# Patient Record
Sex: MALE | Race: WHITE | NOT HISPANIC OR LATINO | Employment: OTHER | URBAN - METROPOLITAN AREA
[De-identification: names, ages, dates, MRNs, and addresses within clinical notes are randomized per-mention and may not be internally consistent; named-entity substitution may affect disease eponyms.]

---

## 2017-01-04 ENCOUNTER — TREATMENT (OUTPATIENT)
Dept: PHYSICAL THERAPY | Facility: CLINIC | Age: 82
End: 2017-01-04

## 2017-01-04 DIAGNOSIS — M54.50 ACUTE RIGHT-SIDED LOW BACK PAIN WITHOUT SCIATICA: Primary | ICD-10-CM

## 2017-01-04 DIAGNOSIS — R29.3 ABNORMAL POSTURE: ICD-10-CM

## 2017-01-04 PROCEDURE — 97140 MANUAL THERAPY 1/> REGIONS: CPT | Performed by: PHYSICAL THERAPIST

## 2017-01-04 PROCEDURE — 97110 THERAPEUTIC EXERCISES: CPT | Performed by: PHYSICAL THERAPIST

## 2017-01-04 PROCEDURE — 97112 NEUROMUSCULAR REEDUCATION: CPT | Performed by: PHYSICAL THERAPIST

## 2017-01-05 NOTE — PROGRESS NOTES
Daily Progress Note  Visits: 6      Subjective  Doing okay.  I still just notice pain when I do too much standing.  The back and hip is doing better overall because I can do more with less rest or pain.   Pain level: (0-10):   Objective      See manual, procedures and exercise log  Assessment/Plan  Pt did well with PT intervention today.  We were able to progress to prone press ups and posture training.  He still requires consistent cues (verbal and tactile) but can assume optimal postures and hold momentarily.    Other: re-assess next visit       Manual Therapy:    12     mins  88087;  Therapeutic Exercise:    20     mins  44554;     Neuromuscular Adeline:    8    mins  27933;    Therapeutic Activity:     -     mins  40419;     Gait Training:      -     mins  63806;     Ultrasound:     -     mins  11910;    Electrical Stimulation:    -     mins  15515 ( );  Dry Needling     -     mins self-pay    Timed Code Treatment: 40 Minutes   Total Treatment Time: 65 Minutes      Mateusz Copeland PT  Physical Therapist, DPT  #[id number]

## 2017-01-06 ENCOUNTER — TREATMENT (OUTPATIENT)
Dept: PHYSICAL THERAPY | Facility: CLINIC | Age: 82
End: 2017-01-06

## 2017-01-06 DIAGNOSIS — M54.50 ACUTE RIGHT-SIDED LOW BACK PAIN WITHOUT SCIATICA: ICD-10-CM

## 2017-01-06 DIAGNOSIS — R29.3 ABNORMAL POSTURE: Primary | ICD-10-CM

## 2017-01-06 PROCEDURE — 97530 THERAPEUTIC ACTIVITIES: CPT | Performed by: PHYSICAL THERAPIST

## 2017-01-06 PROCEDURE — 97110 THERAPEUTIC EXERCISES: CPT | Performed by: PHYSICAL THERAPIST

## 2017-01-06 NOTE — PROGRESS NOTES
Progress Note      Patient: Donnell Nguyen   : 1935  Diagnosis/ICD-10 Code:  Abnormal posture [R29.3]  Referring practitioner: MARCELA Bowser  Date of Initial Visit: 16  Today's Date: 2017  Patient seen for 7 sessions    Subjective:   Donnell Nguyen reports: I have more days with less pain in the back.  I do still get tired and some pain in the back if I get too involved in what I'm doing at home and forget to rest or stretch.  When it doesn't hurt, it feels really good.  It will just hit me every now and then.    Subjective Questionnaire: Oswestry: 20% (40% at eval)    Clinical Progress: improved  Home Program Compliance: Yes, but could be better(self reported)   Treatment has included: therapeutic exercise, neuromuscular re-education, manual therapy, therapeutic activity and moist heat    Objective:    Posture: lessed flexed trunk in standing. Observed standing with < 10 degrees for trunk flexion.    AROM:      Lumbar Spine Range of Motion  Lumbar Spine Flexion: 65  Lumbar Spine Extension: 20  Lumbar Spine Sidebend Left: 26  Lumbar Spine Sidebend Right: 23  Lumbar Spine Rotation Left: 65  Lumbar Spine Rotation Right: 62  Strength:      Strength RLE  R Hip Flexion: 5/5  R Hip Extension: 4/5 ((B))  R Hip ABduction: 4+/5  R Hip ADduction: 4+/5  R Knee Flexion: 5/5  R Knee Extension: 5/5  R Ankle Dorsiflexion: 5-/5  R Ankle Plantar Flexion: 4+/5                Assessment:   Functional Limitations: Lifting, Walking, Complaints of Pain, Difficulty moving, Decreased ROM, Postural Dysfunction, Poor segmental mobility, Decreased ability to perform ADL's   Focus has only recently been able to shift toward postural re-education and endurance training.  He would benefit from a couple of weeks to help solidify postural awareness and progression of TE.      Plan:   PT Interventions: Therapeutic exercise - AROM, Therapeutic exercise - stretching, Therapeutic exercise - strengthening, Manual Therapy, Soft  Tissue mobilization, Posture training,  training, Balance Training, Home Exercise Program  Progress toward previous goals: Partially Met     SHORT TERM GOALS:   2 weeks      1. Patient to be compliant and progression of HEP   2. Pain level < 5/10 at worst with mentioned activities to improve function  3. Increased thoracic, lumbar and SIJ mobility to allow for increased lumbar AROM with less pain  4. Increased lumbar AROM to by 25% in all planes to allow for increased ease with sit-stand transfers and functional activities.     LONG TERM GOALS:  4 weeks   1. Pt. to score < 15 % on Back Index  2. Pain level < 2/10 with all listed activities to return to normal  3. Lumbar AROM to WFL to allow for return to household & recreational activities w/o increased symptoms  4. (B) LE and lower abdominal strength to 5/5 to allow for pushing, pulling and activities to occur without pain (driving, sitting, household)    Recommendations: Continue as planned  Timeframe: 2 weeks  Prognosis to achieve goals: good    Therapy Exercise 49688 30 minutes and Ther Act 28536 10 minutes (for tests and measurements)     Timed Code Treatment: 40   Minutes     Total Treatment Time: 55      Minutes      PT Signature: Mateusz Copeland PT  Lic # [id number]      Based upon review of the patient's progress and continued therapy plan, it is my medical opinion that Donnell Nguyen should continue physical therapy treatment at [facility]  [address] [facility], [address] [address].    Signature:  MARCELA Bowser

## 2017-01-06 NOTE — LETTER
Progress Note      Patient: Donnell Nguyen   : 1935  Diagnosis/ICD-10 Code:  Abnormal posture [R29.3]  Referring practitioner: MARCELA Bowser  Date of Initial Visit: 16  Today's Date: 2017  Patient seen for 7 sessions    Subjective:   Donnell Nguyen reports: I have more days with less pain in the back.  I do still get tired and some pain in the back if I get too involved in what I'm doing at home and forget to rest or stretch.  When it doesn't hurt, it feels really good.  It will just hit me every now and then.    Subjective Questionnaire: Oswestry: 20% (40% at eval)    Clinical Progress: improved  Home Program Compliance: Yes, but could be better(self reported)   Treatment has included: therapeutic exercise, neuromuscular re-education, manual therapy, therapeutic activity and moist heat    Objective:    Posture: lessed flexed trunk in standing. Observed standing with < 10 degrees for trunk flexion.    AROM:      Lumbar Spine Range of Motion  Lumbar Spine Flexion: 65  Lumbar Spine Extension: 20  Lumbar Spine Sidebend Left: 26  Lumbar Spine Sidebend Right: 23  Lumbar Spine Rotation Left: 65  Lumbar Spine Rotation Right: 62  Strength:      Strength RLE  R Hip Flexion: 5/5  R Hip Extension: 4/5 ((B))  R Hip ABduction: 4+/5  R Hip ADduction: 4+/5  R Knee Flexion: 5/5  R Knee Extension: 5/5  R Ankle Dorsiflexion: 5-/5  R Ankle Plantar Flexion: 4+/5                Assessment:   Functional Limitations: Lifting, Walking, Complaints of Pain, Difficulty moving, Decreased ROM, Postural Dysfunction, Poor segmental mobility, Decreased ability to perform ADL's   Focus has only recently been able to shift toward postural re-education and endurance training.  He would benefit from a couple of weeks to help solidify postural awareness and progression of TE.      Plan:   PT Interventions: Therapeutic exercise - AROM, Therapeutic exercise - stretching, Therapeutic exercise - strengthening, Manual Therapy, Soft  Tissue mobilization, Posture training,  training, Balance Training, Home Exercise Program  Progress toward previous goals: Partially Met     SHORT TERM GOALS:   2 weeks      1. Patient to be compliant and progression of HEP   2. Pain level < 5/10 at worst with mentioned activities to improve function  3. Increased thoracic, lumbar and SIJ mobility to allow for increased lumbar AROM with less pain  4. Increased lumbar AROM to by 25% in all planes to allow for increased ease with sit-stand transfers and functional activities.     LONG TERM GOALS:  4 weeks   1. Pt. to score < 15 % on Back Index  2. Pain level < 2/10 with all listed activities to return to normal  3. Lumbar AROM to WFL to allow for return to household & recreational activities w/o increased symptoms  4. (B) LE and lower abdominal strength to 5/5 to allow for pushing, pulling and activities to occur without pain (driving, sitting, household)    Recommendations: Continue as planned  Timeframe: 2 weeks  Prognosis to achieve goals: good    Therapy Exercise 70998 30 minutes    Timed Code Treatment: 30   Minutes     Total Treatment Time: 55      Minutes      PT Signature: Mateusz Copeland PT  Lic # [id number]      Based upon review of the patient's progress and continued therapy plan, it is my medical opinion that Donnell Nguyen should continue physical therapy treatment at [facility]  [address] [facility], [address] [address].    Signature:  MARCELA Bowser

## 2017-01-09 ENCOUNTER — TREATMENT (OUTPATIENT)
Dept: PHYSICAL THERAPY | Facility: CLINIC | Age: 82
End: 2017-01-09

## 2017-01-09 DIAGNOSIS — R29.3 ABNORMAL POSTURE: Primary | ICD-10-CM

## 2017-01-09 DIAGNOSIS — M54.50 ACUTE RIGHT-SIDED LOW BACK PAIN WITHOUT SCIATICA: ICD-10-CM

## 2017-01-09 PROCEDURE — 97110 THERAPEUTIC EXERCISES: CPT | Performed by: PHYSICAL THERAPIST

## 2017-01-11 NOTE — PROGRESS NOTES
Daily Progress Note  Visits: 8      Subjective  I'm moving slowly today.  I have had a little hip discomfort today and I'm a little tired.    Pain level: (0-10):   Objective : Pt. Demonstrating less foot clearance on left and shuffled gait today.      See manual, procedures and exercise log  Assessment/Plan  Pt was able to work through all TE well today.  We continue to work on optimizing his posture.  He concluded treatment with moist heat to lumbar spine and reported feeling better.      Progress per Plan of Care       Manual Therapy:    -     mins  13514;  Therapeutic Exercise:    35     mins  90192;     Neuromuscular Adeline:    5    mins  71130;    Therapeutic Activity:     -     mins  12457;     Gait Training:      -     mins  82441;     Ultrasound:     -     mins  47894;    Electrical Stimulation:    -     mins  56821 ( );  Dry Needling     -     mins self-pay    Timed Code Treatment: 40 Minutes   Total Treatment Time: 65 Minutes      Mateusz Copeland PT  Physical Therapist, DPT  #[id number]

## 2017-01-12 ENCOUNTER — TREATMENT (OUTPATIENT)
Dept: PHYSICAL THERAPY | Facility: CLINIC | Age: 82
End: 2017-01-12

## 2017-01-12 DIAGNOSIS — R29.3 ABNORMAL POSTURE: Primary | ICD-10-CM

## 2017-01-12 DIAGNOSIS — M54.50 ACUTE RIGHT-SIDED LOW BACK PAIN WITHOUT SCIATICA: ICD-10-CM

## 2017-01-12 PROCEDURE — 97140 MANUAL THERAPY 1/> REGIONS: CPT | Performed by: PHYSICAL THERAPIST

## 2017-01-12 PROCEDURE — 97110 THERAPEUTIC EXERCISES: CPT | Performed by: PHYSICAL THERAPIST

## 2017-01-13 NOTE — PROGRESS NOTES
Daily Progress Note  Visits: 9      Subjective  Still feeling a little tired/slow today, but a little better than last visit.    Pain level: (0-10):   Objective : Posture: continues to be challenge to maintain neutral spine.      See manual, procedures and exercise log  Assessment/Plan  Pt was able to work through complete program and does have increased capacity of optimal posture but has difficulty maintaining.  Manual intervention provided today to enhance lower thoracic and lumbar IV mobility.     Progress per Plan of Care       Manual Therapy:    8     mins  75362;  Therapeutic Exercise:    30     mins  00246;     Neuromuscular Adeline:    5    mins  83514;    Therapeutic Activity:     -     mins  27084;     Gait Training:      -     mins  78833;     Ultrasound:     -     mins  10567;    Electrical Stimulation:    -     mins  56612 ( );  Dry Needling     -     mins self-pay    Timed Code Treatment: 43 Minutes   Total Treatment Time: 65 Minutes      Mateusz Copeland PT  Physical Therapist, DPT  #[id number]

## 2017-01-16 ENCOUNTER — TREATMENT (OUTPATIENT)
Dept: PHYSICAL THERAPY | Facility: CLINIC | Age: 82
End: 2017-01-16

## 2017-01-16 DIAGNOSIS — R29.3 ABNORMAL POSTURE: Primary | ICD-10-CM

## 2017-01-16 DIAGNOSIS — M54.50 ACUTE RIGHT-SIDED LOW BACK PAIN WITHOUT SCIATICA: ICD-10-CM

## 2017-01-16 PROCEDURE — 97140 MANUAL THERAPY 1/> REGIONS: CPT | Performed by: PHYSICAL THERAPIST

## 2017-01-16 PROCEDURE — 97110 THERAPEUTIC EXERCISES: CPT | Performed by: PHYSICAL THERAPIST

## 2017-01-16 PROCEDURE — 97112 NEUROMUSCULAR REEDUCATION: CPT | Performed by: PHYSICAL THERAPIST

## 2017-01-16 NOTE — PROGRESS NOTES
Daily Progress Note  Visits: 10      Subjective  Pt reports feeling pretty good today. He has started on parkinson's dance classes and doing well.  It helps to loosen up the hips.    Pain level: (0-10):   Objective      See manual, procedures and exercise log  Assessment/Plan  Pt tolerated treatment progression well.  We focused on endurance and fatigue was noted.  Breaks provided frequently.      Progress per Plan of Care       Manual Therapy:    15     mins  37467;  Therapeutic Exercise:    20     mins  03482;     Neuromuscular Adeline:    8    mins  93171;    Therapeutic Activity:     -     mins  71627;     Gait Training:      -     mins  78377;     Ultrasound:     -     mins  26388;    Electrical Stimulation:    -     mins  96856 ( );  Dry Needling     -     mins self-pay    Timed Code Treatment: 43 Minutes   Total Treatment Time: 60 Minutes      Mateusz Copeland PT  Physical Therapist, DPT  #[id number]

## 2017-01-19 ENCOUNTER — TREATMENT (OUTPATIENT)
Dept: PHYSICAL THERAPY | Facility: CLINIC | Age: 82
End: 2017-01-19

## 2017-01-19 DIAGNOSIS — M54.50 ACUTE RIGHT-SIDED LOW BACK PAIN WITHOUT SCIATICA: ICD-10-CM

## 2017-01-19 DIAGNOSIS — R29.3 ABNORMAL POSTURE: Primary | ICD-10-CM

## 2017-01-19 PROCEDURE — 97110 THERAPEUTIC EXERCISES: CPT | Performed by: PHYSICAL THERAPIST

## 2017-01-19 PROCEDURE — 97140 MANUAL THERAPY 1/> REGIONS: CPT | Performed by: PHYSICAL THERAPIST

## 2017-01-22 NOTE — PROGRESS NOTES
Physical Therapy Daily Progress Note  Visits: 11     Subjective Ampvignesho Isetti reports:  I continue to have pain into the leg on the L side but only when I stand/walk.  Sometimes I don't have any pain at all.  I still have a tought time standing upright.  It feels different.    Pain level: (0-10): 0/10 at rest.  3/10 with walking today.      Objective   See Exercise, Manual, and Modality Logs for complete treatment.       Assessment/Plan: Pt was able to tolerate treatment well but we were unable to reproduce symptoms of leg pain beyond standing and walking.      Progress per Plan of Care     Manual Therapy:    12     mins  70287;  Therapeutic Exercise:    25     mins  56144;     Neuromuscular Adeline:        mins  39536;    Therapeutic Activity:          mins  27652;     Gait Training:           mins  41699;     Ultrasound:          mins  33834;    Electrical Stimulation:         mins  50678 ( );  Dry Needling          mins self-pay    Timed Treatment:   37   mins   Total Treatment:     65   mins    LIONEL Edward License #[id number]    Physical Therapist

## 2017-02-22 ENCOUNTER — DOCUMENTATION (OUTPATIENT)
Dept: PHYSICAL THERAPY | Facility: CLINIC | Age: 82
End: 2017-02-22

## 2017-02-22 NOTE — PROGRESS NOTES
NAME: Donnell Nguyen     PATIENT MRN: [id number]  DATE: 2/22/2017    Discharge Note   Visits: 11, Treatment dates 12-13-17 to 1-19-17    Subjective     N/A     Objective     Unable to formally assess objective measures or outcome measure due to unscheduled discharge. Please see last visit or progress note on 1-19-17 to see function at last visit.    Goals: Partially Met    Assessment/Plan /Discharge status.    Pt will be discharged per patient request.  Upon completion of last visit, he did call to report that he would like to try to maintain things on his own and that he would call if more treatment was needed.      Plan Additional:  Discharge to TITO Copeland PT  Physical Therapist  [place] #[id number]

## 2017-03-28 DIAGNOSIS — M54.50 CHRONIC BILATERAL LOW BACK PAIN WITHOUT SCIATICA: Primary | ICD-10-CM

## 2017-03-28 DIAGNOSIS — G89.29 CHRONIC BILATERAL LOW BACK PAIN WITHOUT SCIATICA: Primary | ICD-10-CM

## 2017-09-20 ENCOUNTER — CLINICAL SUPPORT (OUTPATIENT)
Dept: FAMILY MEDICINE CLINIC | Facility: CLINIC | Age: 82
End: 2017-09-20

## 2017-09-20 DIAGNOSIS — Z23 NEED FOR VACCINATION: Primary | ICD-10-CM

## 2017-09-20 PROCEDURE — G0008 ADMIN INFLUENZA VIRUS VAC: HCPCS | Performed by: FAMILY MEDICINE

## 2018-06-15 ENCOUNTER — TELEPHONE (OUTPATIENT)
Dept: FAMILY MEDICINE CLINIC | Facility: CLINIC | Age: 83
End: 2018-06-15

## 2018-06-15 RX ORDER — DESONIDE 0.5 MG/G
CREAM TOPICAL 3 TIMES DAILY
Qty: 15 G | Refills: 2 | Status: SHIPPED | OUTPATIENT
Start: 2018-06-15 | End: 2018-11-24 | Stop reason: SDUPTHER

## 2018-06-20 ENCOUNTER — TELEPHONE (OUTPATIENT)
Dept: FAMILY MEDICINE CLINIC | Facility: CLINIC | Age: 83
End: 2018-06-20

## 2018-06-20 NOTE — TELEPHONE ENCOUNTER
Left message asking for order for a back brace to help him stand straight.    Also asking for a referral to see a podiatrist due to being flat footed. Says it is causing him to wear wrong shoes which is causing him to strain his back as well.    Please advise & I'll call him back,thanks!    [phone]

## 2018-06-22 DIAGNOSIS — M54.40 LOW BACK PAIN WITH SCIATICA, SCIATICA LATERALITY UNSPECIFIED, UNSPECIFIED BACK PAIN LATERALITY, UNSPECIFIED CHRONICITY: Primary | ICD-10-CM

## 2018-07-05 ENCOUNTER — OFFICE VISIT (OUTPATIENT)
Dept: FAMILY MEDICINE CLINIC | Facility: CLINIC | Age: 83
End: 2018-07-05

## 2018-07-05 VITALS
SYSTOLIC BLOOD PRESSURE: 100 MMHG | DIASTOLIC BLOOD PRESSURE: 64 MMHG | WEIGHT: 172 LBS | HEIGHT: 62 IN | HEART RATE: 64 BPM | OXYGEN SATURATION: 97 % | RESPIRATION RATE: 18 BRPM | BODY MASS INDEX: 31.65 KG/M2

## 2018-07-05 DIAGNOSIS — M48.061 SPINAL STENOSIS OF LUMBAR REGION WITHOUT NEUROGENIC CLAUDICATION: Primary | ICD-10-CM

## 2018-07-05 DIAGNOSIS — M54.42 CHRONIC MIDLINE LOW BACK PAIN WITH BILATERAL SCIATICA: ICD-10-CM

## 2018-07-05 DIAGNOSIS — G20 PARKINSON DISEASE (HCC): ICD-10-CM

## 2018-07-05 DIAGNOSIS — M54.41 CHRONIC MIDLINE LOW BACK PAIN WITH BILATERAL SCIATICA: ICD-10-CM

## 2018-07-05 DIAGNOSIS — G89.29 CHRONIC MIDLINE LOW BACK PAIN WITH BILATERAL SCIATICA: ICD-10-CM

## 2018-07-05 PROBLEM — G20.A1 PARKINSON DISEASE: Status: ACTIVE | Noted: 2018-07-05

## 2018-07-05 PROCEDURE — 99214 OFFICE O/P EST MOD 30 MIN: CPT | Performed by: FAMILY MEDICINE

## 2018-07-05 RX ORDER — AMANTADINE HYDROCHLORIDE 100 MG/1
100 TABLET ORAL EVERY 12 HOURS SCHEDULED
COMMUNITY

## 2018-07-05 RX ORDER — METOPROLOL SUCCINATE 50 MG/1
50 TABLET, EXTENDED RELEASE ORAL DAILY
COMMUNITY
End: 2021-01-11 | Stop reason: SDUPTHER

## 2018-07-05 RX ORDER — CARBIDOPA AND LEVODOPA 25; 100 MG/1; MG/1
2 TABLET, EXTENDED RELEASE ORAL 4 TIMES DAILY
COMMUNITY
End: 2020-06-10 | Stop reason: DRUGHIGH

## 2018-07-05 RX ORDER — NAPROXEN SODIUM 220 MG
220 TABLET ORAL 2 TIMES DAILY PRN
COMMUNITY
End: 2018-07-05

## 2018-07-05 RX ORDER — TAMSULOSIN HYDROCHLORIDE 0.4 MG/1
CAPSULE ORAL
COMMUNITY
Start: 2018-04-25

## 2018-07-05 NOTE — PROGRESS NOTES
Subjective   Donnell Nguyen is a 82 y.o. male.     History of Present Illness   Mr Nguyen is here w/ c/o back pain.  He is very hunched over and states it is difficult for him to straighten himself.  Mr Nguyen states standing and walking are quite difficult for him, standing to cook meals and walking around the house to perform ADL's can be quite taxing.  He is currently in Physical therapy and believes this is helping some.  He has a hx of Parkinsons and is taking a PT program that involves dance that helps with mobility. He has known lumbar stenosis and chronic lumabr pain with sciatica.  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     The following portions of the patient's history were reviewed and updated as appropriate: allergies, current medications, past family history, past medical history, past social history, past surgical history and problem list.    Review of Systems   Musculoskeletal: Positive for back pain and myalgias.   All other systems reviewed and are negative.      Objective   Physical Exam   Constitutional: He is oriented to person, place, and time. He appears well-developed and well-nourished.   HENT:   Head: Normocephalic and atraumatic.   Eyes: Conjunctivae and EOM are normal. Pupils are equal, round, and reactive to light.   Cardiovascular: Normal rate and regular rhythm.    Pulmonary/Chest: Effort normal and breath sounds normal.   Musculoskeletal:   Struggles rising from a chair, stiff leg walking, bent at waist.    Neurological: He is alert and oriented to person, place, and time.   Skin: Skin is warm and dry.   Psychiatric: He has a normal mood and affect. His behavior is normal. Judgment and thought content normal.   Nursing note and vitals reviewed.      Assessment/Plan   Donnell was seen today for back pain.    Diagnoses and all orders for this visit:    Spinal stenosis of lumbar region without neurogenic claudication  He will continue PT and I have advised OTC pain  meds.  Chronic midline low back pain with bilateral sciatica  He will try OTC pain meds and we will work on getting a brace.  Parkinson disease (CMS/HCC)  He will continue to work on dance PT and he is taking all meds as prescribed.     I am going to advise PT to evaluate him for a back brace to see if that will give him the support he needs to stand comfortably for more than a few minutes.

## 2018-08-24 DIAGNOSIS — H93.8X3 CLOGGED EAR, BILATERAL: Primary | ICD-10-CM

## 2018-09-26 ENCOUNTER — OFFICE VISIT (OUTPATIENT)
Dept: FAMILY MEDICINE CLINIC | Facility: CLINIC | Age: 83
End: 2018-09-26

## 2018-09-26 VITALS
DIASTOLIC BLOOD PRESSURE: 64 MMHG | WEIGHT: 175 LBS | RESPIRATION RATE: 12 BRPM | OXYGEN SATURATION: 97 % | HEART RATE: 65 BPM | HEIGHT: 62 IN | BODY MASS INDEX: 32.2 KG/M2 | SYSTOLIC BLOOD PRESSURE: 110 MMHG

## 2018-09-26 DIAGNOSIS — I10 ESSENTIAL HYPERTENSION: ICD-10-CM

## 2018-09-26 DIAGNOSIS — Z00.00 ROUTINE GENERAL MEDICAL EXAMINATION AT A HEALTH CARE FACILITY: Primary | ICD-10-CM

## 2018-09-26 DIAGNOSIS — Z23 NEED FOR VACCINATION: ICD-10-CM

## 2018-09-26 DIAGNOSIS — R53.83 OTHER FATIGUE: ICD-10-CM

## 2018-09-26 DIAGNOSIS — G20 PARKINSON DISEASE (HCC): ICD-10-CM

## 2018-09-26 PROCEDURE — 90662 IIV NO PRSV INCREASED AG IM: CPT | Performed by: FAMILY MEDICINE

## 2018-09-26 PROCEDURE — G0439 PPPS, SUBSEQ VISIT: HCPCS | Performed by: FAMILY MEDICINE

## 2018-09-26 PROCEDURE — G0008 ADMIN INFLUENZA VIRUS VAC: HCPCS | Performed by: FAMILY MEDICINE

## 2018-09-26 PROCEDURE — 96160 PT-FOCUSED HLTH RISK ASSMT: CPT | Performed by: FAMILY MEDICINE

## 2018-09-26 RX ORDER — DOCUSATE SODIUM 100 MG/1
100 CAPSULE, LIQUID FILLED ORAL 4 TIMES DAILY
COMMUNITY

## 2018-09-26 NOTE — PROGRESS NOTES
Medicare Subsequent Wellness Visit  Subjective   History of Present Illness    Donnell Nguyen is a 83 y.o. male who presents for an Medicare Wellness Visit. In addition, we addressed the following health issues:  He has a hx of Parkinsons and is followed by neurology.He recently fell but did not cause debilitating injuries. He is now walking with a cane.  Donnell is c/o of a history of chronic fatigue and lethargy. This is a constant problem in daily life and interferes with his ability to manage routine tasks and social life. This seems to be getting worse for this patient and he is asking for evaluation.  Donnell has a history of chronic hypertension and has been well controlled on current medications.He is tolerating medications without side effect. He reports no vision changes, headaches or lightheadedness. He is requesting refills of medications.    PMH, PSH, SocHx, FamHx, Allergies, and Medications: Reviewed and updated in the history section of chart.  History reviewed. No pertinent family history.    Social History     Social History Narrative   • No narrative on file       Allergies   Allergen Reactions   • Penicillins      Fainting.       Outpatient Medications Prior to Visit   Medication Sig Dispense Refill   • amantadine (SYMMETREL) 100 MG tablet Take 100 mg by mouth Every 12 (Twelve) Hours.     • ASPIRIN ADULT LOW DOSE PO Take  by mouth.     • baclofen (LIORESAL) 10 MG tablet Take 1 tablet by mouth 3 (Three) Times a Day. 30 tablet 0   • carbidopa-levodopa ER (SINEMET CR)  MG per tablet Take 2 tablets by mouth 4 (Four) Times a Day.     • metoprolol succinate XL (TOPROL-XL) 50 MG 24 hr tablet Take 50 mg by mouth Daily.     • tamsulosin (FLOMAX) 0.4 MG capsule 24 hr capsule TAKE 1 CAPSULE DAILY 30 MINUTES AFTER THE SAME MEAL EACH DAY     • desonide (DESOWEN) 0.05 % cream Apply  topically 3 (Three) Times a Day. 15 g 2     No facility-administered medications prior to visit.         Patient Active  Problem List   Diagnosis   • Parkinson disease (CMS/[facility])   • Hypertension         Patient Care Team:  Eduardo Snowden MD as PCP - General  Health Habits:  Current Diet: Well balanced diet  Dental Exam. UTD  Eye Exam. UTD  Exercise:yes  Current exercise activities include:Dance with a Parkinsons group    Recent Hospitalizations:  none    Age-appropriate Screening Schedule:  Refer to the list below for future screening recommendations based on patient's age. Orders for these recommended tests are listed in the plan section. The patient has been provided with a written plan.    Health Maintenance   Topic Date Due   • TDAP/TD VACCINES (1 - Tdap) 09/13/1956   • ZOSTER VACCINE (1 of 2) 09/13/1987   • PNEUMOCOCCAL VACCINES (65+ LOW/MEDIUM RISK) (1 of 2 - PCV13) 09/13/2002   • MEDICARE ANNUAL WELLNESS  09/26/2019   • INFLUENZA VACCINE  Completed       Depression Screen:   PHQ-2/PHQ-9 Depression Screening 7/5/2018   Little interest or pleasure in doing things 0   Feeling down, depressed, or hopeless 0   Total Score 0       Functional and Cognitive Screening:  Functional & Cognitive Status 9/26/2018   Do you have difficulty preparing food and eating? No   Do you have difficulty bathing yourself, getting dressed or grooming yourself? No   Do you have difficulty using the toilet? No   Do you have difficulty moving around from place to place? No   Do you have trouble with steps or getting out of a bed or a chair? No   In the past year have you fallen or experienced a near fall? No   Current Diet Well Balanced Diet   Dental Exam Up to date   Eye Exam Up to date   Exercise (times per week) 2 times per week   Current Exercise Activities Include Housecleaning   Do you need help using the phone?  No   Are you deaf or do you have serious difficulty hearing?  No   Do you need help with transportation? No   Do you need help shopping? No   Do you need help preparing meals?  No   Do you need help with housework?  No   Do you need help  "with laundry? No   Do you need help taking your medications? No   Do you need help managing money? No   Do you ever drive or ride in a car without wearing a seat belt? No   Have you felt unusual stress, anger or loneliness in the last month? No   If you need help, do you have trouble finding someone available to you? No   Have you been bothered in the last four weeks by sexual problems? No   Do you have difficulty concentrating, remembering or making decisions? No     Does the patient have evidence of cognitive impairment? none      Review of Systems   Constitutional: Positive for fatigue.   HENT: Negative.    Eyes: Negative.    Respiratory: Negative.    Cardiovascular: Negative.    Gastrointestinal: Negative.    Genitourinary: Negative.    Musculoskeletal: Positive for arthralgias and back pain.   Skin: Negative.    Neurological: Negative.    Psychiatric/Behavioral: Negative.        Objective     Vitals:    09/26/18 1409   BP: 110/64   Pulse: 65   Resp: 12   SpO2: 97%   Weight: 79.4 kg (175 lb)   Height: 157.5 cm (62.01\")       Body mass index is 32 kg/m².    PHYSICAL EXAM  Vitals reviewed and on chart.  HEENT: PERRLA, EOMI. Oral mucosa moist,   No LAD.  CV: RRR, no murmurs, rubs, clicks or gallops  LUNGS: CTA bilaterally  EXT: No edema, FROM in bilateral upper and lower ext  NEURO: CN II - XII grossly intact        ASSESSMENT AND PLAN      Problem List Items Addressed This Visit        Cardiovascular and Mediastinum    Hypertension  Well controlled on current medication, will refill medication today and as needed. He will RTO for repeat B/P check in 6 months.    Relevant Orders    Comprehensive Metabolic Panel       Nervous and Auditory    Parkinson disease (CMS/HCC)  He feels it is getting worse. He is followed by neurology.      Other Visit Diagnoses     Routine general medical examination at a health care facility    -  Primary    Relevant Orders    Comprehensive Metabolic Panel    Lipid Panel With / Chol / HDL " Ratio    Other fatigue        Relevant Orders    CBC (No Diff)          Orders:  Orders Placed This Encounter   Procedures   • CBC (No Diff)   • Comprehensive Metabolic Panel   • Lipid Panel With / Chol / HDL Ratio       Follow Up:  Return in about 1 year (around 9/26/2019).     ADVANCED DIRECTIVES: yes    An After Visit Summary and PPPS with all of these plans were given to the patient.

## 2018-09-26 NOTE — PATIENT INSTRUCTIONS
Medicare Wellness  Personal Prevention Plan of Service     Date of Office Visit:  2018  Encounter Provider:  Eduardo Snowden MD  Place of Service:  [facility]  Patient Name: Donnell Nguyen  :  1935    As part of the Medicare Wellness portion of your visit today, we are providing you with this personalized preventive plan of services (PPPS). This plan is based upon recommendations of the [place] Preventive Services Task Force (USPSTF) and the Advisory Committee on Immunization Practices (ACIP).    This lists the preventive care services that should be considered, and provides dates of when you are due. Items listed as completed are up-to-date and do not require any further intervention.    Health Maintenance   Topic Date Due   • TDAP/TD VACCINES (1 - Tdap) 1956   • ZOSTER VACCINE (1 of 2) 1987   • PNEUMOCOCCAL VACCINES (65+ LOW/MEDIUM RISK) (1 of 2 - PCV13) 2002   • MEDICARE ANNUAL WELLNESS  2019   • INFLUENZA VACCINE  Completed       Orders Placed This Encounter   Procedures   • CBC (No Diff)     Standing Status:   Future     Standing Expiration Date:   2019   • Comprehensive Metabolic Panel     Standing Status:   Future     Standing Expiration Date:   2019   • Lipid Panel With / Chol / HDL Ratio     Standing Status:   Future     Standing Expiration Date:   2019       Return in about 1 year (around 2019).

## 2018-10-01 LAB
ALBUMIN SERPL-MCNC: 4.7 G/DL (ref 3.5–5.2)
ALBUMIN/GLOB SERPL: 2.2 G/DL
ALP SERPL-CCNC: 78 U/L (ref 39–117)
ALT SERPL-CCNC: 8 U/L (ref 1–41)
AST SERPL-CCNC: 10 U/L (ref 1–40)
BILIRUB SERPL-MCNC: 0.5 MG/DL (ref 0.1–1.2)
BUN SERPL-MCNC: 16 MG/DL (ref 8–23)
BUN/CREAT SERPL: 13.3 (ref 7–25)
CALCIUM SERPL-MCNC: 9.7 MG/DL (ref 8.6–10.5)
CHLORIDE SERPL-SCNC: 102 MMOL/L (ref 98–107)
CHOLEST SERPL-MCNC: 183 MG/DL (ref 0–200)
CHOLEST/HDLC SERPL: 3.59 {RATIO}
CO2 SERPL-SCNC: 27.3 MMOL/L (ref 22–29)
CREAT SERPL-MCNC: 1.2 MG/DL (ref 0.76–1.27)
ERYTHROCYTE [DISTWIDTH] IN BLOOD BY AUTOMATED COUNT: 13.2 % (ref 11.5–14.5)
GLOBULIN SER CALC-MCNC: 2.1 GM/DL
GLUCOSE SERPL-MCNC: 101 MG/DL (ref 65–99)
HCT VFR BLD AUTO: 47.2 % (ref 40.4–52.2)
HDLC SERPL-MCNC: 51 MG/DL (ref 40–60)
HGB BLD-MCNC: 15 G/DL (ref 13.7–17.6)
LDLC SERPL CALC-MCNC: 114 MG/DL (ref 0–100)
MCH RBC QN AUTO: 30.4 PG (ref 27–32.7)
MCHC RBC AUTO-ENTMCNC: 31.8 G/DL (ref 32.6–36.4)
MCV RBC AUTO: 95.5 FL (ref 79.8–96.2)
PLATELET # BLD AUTO: 160 10*3/MM3 (ref 140–500)
POTASSIUM SERPL-SCNC: 4.6 MMOL/L (ref 3.5–5.2)
PROT SERPL-MCNC: 6.8 G/DL (ref 6–8.5)
RBC # BLD AUTO: 4.94 10*6/MM3 (ref 4.6–6)
SODIUM SERPL-SCNC: 140 MMOL/L (ref 136–145)
TRIGL SERPL-MCNC: 88 MG/DL (ref 0–150)
VLDLC SERPL CALC-MCNC: 17.6 MG/DL (ref 5–40)
WBC # BLD AUTO: 4.88 10*3/MM3 (ref 4.5–10.7)

## 2018-10-03 ENCOUNTER — RESULTS ENCOUNTER (OUTPATIENT)
Dept: FAMILY MEDICINE CLINIC | Facility: CLINIC | Age: 83
End: 2018-10-03

## 2018-10-03 DIAGNOSIS — Z00.00 ROUTINE GENERAL MEDICAL EXAMINATION AT A HEALTH CARE FACILITY: ICD-10-CM

## 2018-10-03 DIAGNOSIS — I10 ESSENTIAL HYPERTENSION: ICD-10-CM

## 2018-10-03 DIAGNOSIS — R53.83 OTHER FATIGUE: ICD-10-CM

## 2018-11-26 RX ORDER — DESONIDE 0.5 MG/G
CREAM TOPICAL
Qty: 15 G | Refills: 3 | Status: SHIPPED | OUTPATIENT
Start: 2018-11-26

## 2019-09-18 ENCOUNTER — OFFICE VISIT (OUTPATIENT)
Dept: FAMILY MEDICINE CLINIC | Facility: CLINIC | Age: 84
End: 2019-09-18

## 2019-09-18 VITALS
OXYGEN SATURATION: 97 % | HEART RATE: 63 BPM | SYSTOLIC BLOOD PRESSURE: 122 MMHG | RESPIRATION RATE: 16 BRPM | WEIGHT: 174.3 LBS | HEIGHT: 62 IN | DIASTOLIC BLOOD PRESSURE: 60 MMHG | BODY MASS INDEX: 32.07 KG/M2

## 2019-09-18 DIAGNOSIS — M25.512 ACUTE PAIN OF LEFT SHOULDER: Primary | ICD-10-CM

## 2019-09-18 DIAGNOSIS — G20 PARKINSON DISEASE (HCC): ICD-10-CM

## 2019-09-18 PROCEDURE — 99213 OFFICE O/P EST LOW 20 MIN: CPT | Performed by: FAMILY MEDICINE

## 2019-09-18 RX ORDER — TRAMADOL HYDROCHLORIDE 50 MG/1
50 TABLET ORAL EVERY 6 HOURS PRN
Qty: 30 TABLET | Refills: 0 | Status: SHIPPED | OUTPATIENT
Start: 2019-09-18 | End: 2019-12-10

## 2019-09-18 NOTE — PROGRESS NOTES
Subjective   Donnell Nguyen is a 84 y.o. male.   Jesse is here with left shoulder pain.    History of Present Illness   He is  here for left shoulder pain that can radiate up to the base of the skull.  He states started about 1 week ago and comes and goes.  The only comfortable position is over his head and flexed. He has not gotten any relief from OTC meds. He feels it is getting worse.    The following portions of the patient's history were reviewed and updated as appropriate: allergies, current medications, past family history, past medical history, past social history, past surgical history and problem list.    Review of Systems   Constitutional: Negative.    HENT: Negative.    Eyes: Negative.    Respiratory: Negative.    Cardiovascular: Negative.    Genitourinary: Negative.    Musculoskeletal:        Left shoulder pain   Skin: Negative.    Neurological:        Muscle stiffness from Parkinsons   Psychiatric/Behavioral: Negative.        Objective   Physical Exam   Constitutional: He is oriented to person, place, and time. He appears well-developed and well-nourished.   HENT:   Head: Normocephalic and atraumatic.   Eyes: EOM are normal. Pupils are equal, round, and reactive to light.   Neck: Normal range of motion.   Cardiovascular: Normal rate and regular rhythm.   Pulmonary/Chest: Effort normal.   Musculoskeletal:   Pain in left shoulder with palpation of the glenohumeral joint. Pain with flexion and extension, pain with rotation.    Neurological: He is alert and oriented to person, place, and time.   Skin: Skin is warm and dry. No rash noted.   Psychiatric: He has a normal mood and affect. His behavior is normal.   Nursing note and vitals reviewed.        Assessment/Plan   Donnell was seen today for pain.    Diagnoses and all orders for this visit:    Acute pain of left shoulder  I have referred Jesse for evaluation at Sports Medicine  I have given him a short course of tramadol for pain relief.  -     traMADol  (ULTRAM) 50 MG tablet; Take 1 tablet by mouth Every 6 (Six) Hours As Needed for Moderate Pain .  -     Ambulatory Referral to Sports Medicine    Parkinson disease (CMS/HCC)  Stable and followed by neurology.

## 2019-09-19 ENCOUNTER — OFFICE VISIT (OUTPATIENT)
Dept: ORTHOPEDIC SURGERY | Facility: CLINIC | Age: 84
End: 2019-09-19

## 2019-09-19 VITALS — BODY MASS INDEX: 29.53 KG/M2 | WEIGHT: 173 LBS | HEIGHT: 64 IN | TEMPERATURE: 97.3 F

## 2019-09-19 DIAGNOSIS — M25.512 ACUTE PAIN OF LEFT SHOULDER: Primary | ICD-10-CM

## 2019-09-19 DIAGNOSIS — M12.812 ROTATOR CUFF ARTHROPATHY OF LEFT SHOULDER: ICD-10-CM

## 2019-09-19 DIAGNOSIS — M19.012 PRIMARY OSTEOARTHRITIS OF LEFT SHOULDER: ICD-10-CM

## 2019-09-19 PROCEDURE — 20610 DRAIN/INJ JOINT/BURSA W/O US: CPT | Performed by: NURSE PRACTITIONER

## 2019-09-19 PROCEDURE — 73030 X-RAY EXAM OF SHOULDER: CPT | Performed by: NURSE PRACTITIONER

## 2019-09-19 PROCEDURE — 99214 OFFICE O/P EST MOD 30 MIN: CPT | Performed by: NURSE PRACTITIONER

## 2019-09-19 RX ORDER — METHYLPREDNISOLONE ACETATE 80 MG/ML
80 INJECTION, SUSPENSION INTRA-ARTICULAR; INTRALESIONAL; INTRAMUSCULAR; SOFT TISSUE
Status: COMPLETED | OUTPATIENT
Start: 2019-09-19 | End: 2019-09-19

## 2019-09-19 RX ADMIN — METHYLPREDNISOLONE ACETATE 80 MG: 80 INJECTION, SUSPENSION INTRA-ARTICULAR; INTRALESIONAL; INTRAMUSCULAR; SOFT TISSUE at 16:32

## 2019-09-19 NOTE — PATIENT INSTRUCTIONS
Shoulder Pain  Many things can cause shoulder pain, including:  · An injury.  · Moving the arm in the same way again and again (overuse).  · Joint pain (arthritis).  Follow these instructions at home:  Take these actions to help with your pain:  · Squeeze a soft ball or a foam pad as much as you can. This helps to prevent swelling. It also makes the arm stronger.  · Take over-the-counter and prescription medicines only as told by your doctor.  · If told, put ice on the area:  ? Put ice in a plastic bag.  ? Place a towel between your skin and the bag.  ? Leave the ice on for 20 minutes, 2-3 times per day. Stop putting on ice if it does not help with the pain.  · If you were given a shoulder sling or immobilizer:  ? Wear it as told.  ? Remove it to shower or bathe.  ? Move your arm as little as possible.  ? Keep your hand moving. This helps prevent swelling.  Contact a doctor if:  · Your pain gets worse.  · Medicine does not help your pain.  · You have new pain in your arm, hand, or fingers.  Get help right away if:  · Your arm, hand, or fingers:  ? Tingle.  ? Are numb.  ? Are swollen.  ? Are painful.  ? Turn white or blue.  This information is not intended to replace advice given to you by your health care provider. Make sure you discuss any questions you have with your health care provider.  Document Released: 06/05/2009 Document Revised: 08/13/2017 Document Reviewed: 04/11/2016  [employer] Interactive Patient Education © 2019 [employer]

## 2019-09-19 NOTE — PROGRESS NOTES
Patient Name: Donnell Nguyen   YOB: 1935  Referring Primary Care Physician: Eduardo Snowden MD  BMI: Body mass index is 29.7 kg/m².    Chief Complaint:    Chief Complaint   Patient presents with   • Left Shoulder - Establish Care        HPI: Shoulder Pain: Patient complaints of left shoulder pain. This is evaluated as a personal injury. The pain is described as burning, sharp, stabbing and throbbing.  The onset of the pain was gradual, starting about 1 month ago.  The pain occurs continuously and lasts 24 hours.  Location is posterior. No history of dislocation. Symptoms are aggravated by lifting, pulling, pushing. Symptoms are diminished by  rest.   Limited activities include: all activities. No stiffness is reported. Patient is a retired and he it affects activities.         Donnell Nguyen is a 84 y.o. male who presents today for evaluation of   Chief Complaint   Patient presents with   • Left Shoulder - Establish Care   .     This problem is new to this examiner.     Subjective   Medications:   Home Medications:  Current Outpatient Medications on File Prior to Visit   Medication Sig   • amantadine (SYMMETREL) 100 MG tablet Take 100 mg by mouth Every 12 (Twelve) Hours.   • ASPIRIN ADULT LOW DOSE PO Take  by mouth.   • carbidopa-levodopa ER (SINEMET CR)  MG per tablet Take 2 tablets by mouth 4 (Four) Times a Day.   • desonide (DESOWEN) 0.05 % cream APPLY TO AFFECTED AREA THREE TIMES A DAY.   • docusate sodium (COLACE) 100 MG capsule Take 100 mg by mouth 4 (Four) Times a Day.   • metoprolol succinate XL (TOPROL-XL) 50 MG 24 hr tablet Take 50 mg by mouth Daily.   • tamsulosin (FLOMAX) 0.4 MG capsule 24 hr capsule TAKE 1 CAPSULE DAILY 30 MINUTES AFTER THE SAME MEAL EACH DAY   • traMADol (ULTRAM) 50 MG tablet Take 1 tablet by mouth Every 6 (Six) Hours As Needed for Moderate Pain .     No current facility-administered medications on file prior to visit.      Current Medications:  Scheduled  Meds:  Continuous Infusions:  No current facility-administered medications for this visit.   PRN Meds:.    I have reviewed the patient's medical history in detail and updated the computerized patient record.  Review and summarization of old records includes:    Past Medical History:   Diagnosis Date   • Hypertension    • Parkinson disease (CMS/HCC)         Past Surgical History:   Procedure Laterality Date   • CARDIAC CATHETERIZATION     • COLON RESECTION     • CORONARY ARTERY BYPASS GRAFT     • EYE SURGERY     • PROSTATE SURGERY          Social History     Occupational History   • Not on file   Tobacco Use   • Smoking status: Former Smoker   • Smokeless tobacco: Never Used   Substance and Sexual Activity   • Alcohol use: Yes     Alcohol/week: 8.4 - 10.8 oz     Types: 14 - 18 Glasses of wine per week     Comment: 2-3 / night   • Drug use: No   • Sexual activity: Not Currently     Partners: Female      Social History     Social History Narrative    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      History reviewed. No pertinent family history.    ROS: 14 point review of systems was performed and all other systems were reviewed and are negative except for documented findings in HPI and today's encounter.     Allergies:   Allergies   Allergen Reactions   • Penicillins      Fainting.     Constitutional:  Denies fever, shaking or chills   Eyes:  Denies change in visual acuity   HENT:  Denies nasal congestion or sore throat   Respiratory:  Denies cough or shortness of breath   Cardiovascular:  Denies chest pain or severe LE edema   GI:  Denies abdominal pain, nausea, vomiting, bloody stools or diarrhea   Musculoskeletal:  Numbness, tingling, pain, or loss of motor function only as noted above in history of present illness.  : Denies painful urination or hematuria  Integument:  Denies rash, lesion or ulceration   Neurologic:  Denies headache or focal weakness  Endocrine:  Denies lymphadenopathy  Psych:  Denies confusion or  "change in mental status   Hem:  Denies active bleeding    OBJECTIVE:  Physical Exam:   Temp 97.3 °F (36.3 °C) (Temporal)   Ht 162.6 cm (64\")   Wt 78.5 kg (173 lb)   BMI 29.70 kg/m²     General Appearance:    Alert, cooperative, in no acute distress                  Eyes: conjunctiva clear  ENT: external ears and nose atraumatic  CV: no peripheral edema  Resp: normal respiratory effort  Skin: no rashes or wounds; normal turgor  Psych: mood and affect appropriate  Lymph: no nodes appreciated  Neuro: gross sensation intact  Vascular:  Palpable peripheral pulse in noted extremity  Musculoskeletal Extremities: Patient has point tenderness to left shoulder at the AC joint with good range of motion pulses neurovascularly intact skin is warm dry patient also has some diffuse cervical pain    Radiology:   Left shoulder x-rays revealed degenerative acromion process with decreased subacromial space calcifications and moderate arthropathy no fracture noted no readily available films for comparison x-rays were done for acute pain  Assessment:     ICD-10-CM ICD-9-CM   1. Acute pain of left shoulder M25.512 719.41   2. Rotator cuff arthropathy of left shoulder M12.812 716.81   3. Primary osteoarthritis of left shoulder M19.012 715.11        Large Joint Arthrocentesis: L subacromial bursa  Date/Time: 9/19/2019 4:32 PM  Consent given by: patient  Site marked: site marked  Timeout: Immediately prior to procedure a time out was called to verify the correct patient, procedure, equipment, support staff and site/side marked as required   Supporting Documentation  Indications: pain   Procedure Details  Location: shoulder - L subacromial bursa  Needle gauge: 21.  Approach: posterior  Medications administered: 4 mL lidocaine (cardiac); 80 mg methylPREDNISolone acetate 80 MG/ML  Patient tolerance: patient tolerated the procedure well with no immediate complications           Discussed with patient that shoulder pain may be related to his " neck if it does not improve with the cortisone will need to return for possible cervical work-up thank you  Plan: Biomechanics of pertinent body area discussed.  Risks, benefits, alternatives, comparisons, and complications of accepted medicines, injections, recommendations, surgical procedures, and therapies explained and education provided in laymen's terms. Natural history and expected course of this patient's diagnosis discussed along with evaluation of therapies. Questions answered. When appropriate I also discussed proper use of cane, walker, trekking poles.   EXERCISES:  Advice on benefits of, and types of regular/moderate exercise including biomechanical forces involved as it pertains to this complaint.  RICE: Rest, ice, compression, and elevation therapy, Cryotherapy/brachy therapy, and or OTC linaments as indicated with instructions.   Cortisone Injection. See procedure note.      9/19/2019    Much of this encounter note is an electronic transcription/translation of spoken language to printed text. The electronic translation of spoken language may permit erroneous, or at times, nonsensical words or phrases to be inadvertently transcribed; Although I have reviewed the note for such errors, some may still exist

## 2019-09-27 ENCOUNTER — CONSULT (OUTPATIENT)
Dept: ORTHOPEDIC SURGERY | Facility: CLINIC | Age: 84
End: 2019-09-27

## 2019-09-27 VITALS — TEMPERATURE: 97.3 F | BODY MASS INDEX: 32.39 KG/M2 | WEIGHT: 176 LBS | HEIGHT: 62 IN

## 2019-09-27 DIAGNOSIS — M75.52 BURSITIS OF LEFT SHOULDER: Primary | ICD-10-CM

## 2019-09-27 PROCEDURE — 99212 OFFICE O/P EST SF 10 MIN: CPT | Performed by: ORTHOPAEDIC SURGERY

## 2019-09-27 NOTE — PROGRESS NOTES
Patient Name: Donnell Nguyen   YOB: 1935  Referring Primary Care Physician: Eduardo Snowden MD  BMI: Body mass index is 32.19 kg/m².    Chief Complaint:    Chief Complaint   Patient presents with   • Left Shoulder - Establish Care        HPI:     Donnell Nguyen is a 84 y.o. male who presents today for evaluation of   Chief Complaint   Patient presents with   • Left Shoulder - Establish Care   .  Patient is seen today follow-up for his left shoulder pain.  He had a cortisone injection about 2 weeks ago and is feeling much better.  He has a [facility] service coming up for his wife.    This problem is new to this examiner.     Subjective   Medications:   Home Medications:  Current Outpatient Medications on File Prior to Visit   Medication Sig   • amantadine (SYMMETREL) 100 MG tablet Take 100 mg by mouth Every 12 (Twelve) Hours.   • ASPIRIN ADULT LOW DOSE PO Take  by mouth.   • carbidopa-levodopa ER (SINEMET CR)  MG per tablet Take 2 tablets by mouth 4 (Four) Times a Day.   • desonide (DESOWEN) 0.05 % cream APPLY TO AFFECTED AREA THREE TIMES A DAY.   • docusate sodium (COLACE) 100 MG capsule Take 100 mg by mouth 4 (Four) Times a Day.   • metoprolol succinate XL (TOPROL-XL) 50 MG 24 hr tablet Take 50 mg by mouth Daily.   • tamsulosin (FLOMAX) 0.4 MG capsule 24 hr capsule TAKE 1 CAPSULE DAILY 30 MINUTES AFTER THE SAME MEAL EACH DAY   • traMADol (ULTRAM) 50 MG tablet Take 1 tablet by mouth Every 6 (Six) Hours As Needed for Moderate Pain .     No current facility-administered medications on file prior to visit.      Current Medications:  Scheduled Meds:  Continuous Infusions:  No current facility-administered medications for this visit.   PRN Meds:.    I have reviewed the patient's medical history in detail and updated the computerized patient record.  Review and summarization of old records includes:    Past Medical History:   Diagnosis Date   • Hypertension    • Parkinson disease (CMS/[facility])        "  Past Surgical History:   Procedure Laterality Date   • CARDIAC CATHETERIZATION     • COLON RESECTION     • CORONARY ARTERY BYPASS GRAFT     • EYE SURGERY     • PROSTATE SURGERY          Social History     Occupational History   • Not on file   Tobacco Use   • Smoking status: Former Smoker   • Smokeless tobacco: Never Used   Substance and Sexual Activity   • Alcohol use: Yes     Alcohol/week: 8.4 - 10.8 oz     Types: 14 - 18 Glasses of wine per week     Comment: 2-3 / night   • Drug use: No   • Sexual activity: Not Currently     Partners: Female      Social History     Social History Narrative    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      History reviewed. No pertinent family history.    ROS: 14 point review of systems was performed and all other systems were reviewed and are negative except for documented findings in HPI and today's encounter.     Allergies:   Allergies   Allergen Reactions   • Penicillins      Fainting.     Constitutional:  Denies fever, shaking or chills   Eyes:  Denies change in visual acuity   HENT:  Denies nasal congestion or sore throat   Respiratory:  Denies cough or shortness of breath   Cardiovascular:  Denies chest pain or severe LE edema   GI:  Denies abdominal pain, nausea, vomiting, bloody stools or diarrhea   Musculoskeletal:  Numbness, tingling, pain, or loss of motor function only as noted above in history of present illness.  : Denies painful urination or hematuria  Integument:  Denies rash, lesion or ulceration   Neurologic:  Denies headache or focal weakness  Endocrine:  Denies lymphadenopathy  Psych:  Denies confusion or change in mental status   Hem:  Denies active bleeding    OBJECTIVE:  Physical Exam: 84 y.o. male  Wt Readings from Last 3 Encounters:   09/27/19 79.8 kg (176 lb)   09/19/19 78.5 kg (173 lb)   09/18/19 79.1 kg (174 lb 4.8 oz)     Ht Readings from Last 1 Encounters:   09/27/19 157.5 cm (62\")     Body mass index is 32.19 kg/m².  Vitals:    09/27/19 1059 "   Temp: 97.3 °F (36.3 °C)     Vital signs reviewed.     General Appearance:    Alert, cooperative, in no acute distress                  Eyes: conjunctiva clear  ENT: external ears and nose atraumatic  CV: no peripheral edema  Resp: normal respiratory effort  Skin: no rashes or wounds; normal turgor  Psych: mood and affect appropriate  Lymph: no nodes appreciated  Neuro: gross sensation intact  Vascular:  Palpable peripheral pulse in noted extremity  Musculoskeletal Extremities: Exam today shows he has good strength abduction external rotation no impingement he has near full range of motion at least at symmetric bilaterally.  He uses a cane to ambulate    Radiology:   AP scapular Y and axillary view of his left shoulder taken 2 weeks ago and reviewed at this time show mild narrowing of the subacromial joint    Assessment:     ICD-10-CM ICD-9-CM   1. Bursitis of left shoulder M75.52 726.10        Procedures       Plan: Biomechanics of pertinent body area discussed.  Risks, benefits, alternatives, comparisons, and complications of accepted medicines, injections, recommendations, surgical procedures, and therapies explained and education provided in laymen's terms. Natural history and expected course of this patient's diagnosis discussed along with evaluation of therapies. Questions answered. When appropriate I also discussed proper use of cane, walker, trekking poles. Advice given will see him back if necessary it seems like his had a good therapeutic response to the injection and is not weak enough to require therapy or other treatments or imaging at this time.      9/27/2019    Much of this encounter note is an electronic transcription/translation of spoken language to printed text. The electronic translation of spoken language may permit erroneous, or at times, nonsensical words or phrases to be inadvertently transcribed; Although I have reviewed the note for such errors, some may still exist

## 2019-12-10 ENCOUNTER — OFFICE VISIT (OUTPATIENT)
Dept: FAMILY MEDICINE CLINIC | Facility: CLINIC | Age: 84
End: 2019-12-10

## 2019-12-10 VITALS
DIASTOLIC BLOOD PRESSURE: 60 MMHG | SYSTOLIC BLOOD PRESSURE: 94 MMHG | WEIGHT: 172 LBS | RESPIRATION RATE: 16 BRPM | HEIGHT: 62 IN | HEART RATE: 68 BPM | OXYGEN SATURATION: 98 % | BODY MASS INDEX: 31.65 KG/M2

## 2019-12-10 DIAGNOSIS — M54.41 CHRONIC RIGHT-SIDED LOW BACK PAIN WITH RIGHT-SIDED SCIATICA: ICD-10-CM

## 2019-12-10 DIAGNOSIS — I10 ESSENTIAL HYPERTENSION: ICD-10-CM

## 2019-12-10 DIAGNOSIS — Z00.00 MEDICARE ANNUAL WELLNESS VISIT, SUBSEQUENT: Primary | ICD-10-CM

## 2019-12-10 DIAGNOSIS — G89.29 CHRONIC RIGHT-SIDED LOW BACK PAIN WITH RIGHT-SIDED SCIATICA: ICD-10-CM

## 2019-12-10 DIAGNOSIS — G20 PARKINSON DISEASE (HCC): ICD-10-CM

## 2019-12-10 PROCEDURE — 96160 PT-FOCUSED HLTH RISK ASSMT: CPT | Performed by: FAMILY MEDICINE

## 2019-12-10 PROCEDURE — G0439 PPPS, SUBSEQ VISIT: HCPCS | Performed by: FAMILY MEDICINE

## 2019-12-10 RX ORDER — CHOLECALCIFEROL (VITAMIN D3) 125 MCG
5 CAPSULE ORAL
COMMUNITY

## 2019-12-10 NOTE — PROGRESS NOTES
"Medicare Subsequent Wellness Visit  Subjective   History of Present Illness    Donnell Nguyen is a 84 y.o. male who presents for an Medicare Wellness Visit. In addition, we addressed the following health issues:  He has chronic right side back pain that runs down his right leg and cause him to \"freeze\" and be unable to move his right leg forward. This is concerning to him because of his dx of Parkinsons.  Donnell has chronic hypertension and has been well controlled on current medications.He is tolerating medications without side effect. He reports no vision changes, headaches or lightheadedness. He is requesting refills of medications.  PMH, PSH, SocHx, FamHx, Allergies, and Medications: Reviewed and updated in the history section of chart.  History reviewed. No pertinent family history.    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       Allergies   Allergen Reactions   • Penicillins      Fainting.       Outpatient Medications Prior to Visit   Medication Sig Dispense Refill   • amantadine (SYMMETREL) 100 MG tablet Take 100 mg by mouth Every 12 (Twelve) Hours.     • ASPIRIN ADULT LOW DOSE PO Take  by mouth.     • carbidopa-levodopa ER (SINEMET CR)  MG per tablet Take 2 tablets by mouth 4 (Four) Times a Day.     • docusate sodium (COLACE) 100 MG capsule Take 100 mg by mouth 4 (Four) Times a Day.     • melatonin 5 MG tablet tablet Take 5 mg by mouth.     • metoprolol succinate XL (TOPROL-XL) 50 MG 24 hr tablet Take 50 mg by mouth Daily.     • tamsulosin (FLOMAX) 0.4 MG capsule 24 hr capsule TAKE 1 CAPSULE DAILY 30 MINUTES AFTER THE SAME MEAL EACH DAY     • desonide (DESOWEN) 0.05 % cream APPLY TO AFFECTED AREA THREE TIMES A DAY. 15 g 3   • traMADol (ULTRAM) 50 MG tablet Take 1 tablet by mouth Every 6 (Six) Hours As Needed for Moderate Pain . 30 tablet 0     No facility-administered medications prior to visit.         Patient Active Problem List   Diagnosis   • " Parkinson disease (CMS/[facility])   • Hypertension         Patient Care Team:  Eduardo Snowden MD as PCP - General  Health Habits:  Current Diet: Well balanced diet  Dental Exam. UTD  Eye Exam. UTD  Exercise:yes  Current exercise activities include:dance and exercise for Parkinsons    Recent Hospitalizations:  none    Age-appropriate Screening Schedule:  Refer to the list below for future screening recommendations based on patient's age. Orders for these recommended tests are listed in the plan section. The patient has been provided with a written plan.      Health Maintenance   Topic Date Due   • ZOSTER VACCINE (2 of 3) 11/20/2015   • TDAP/TD VACCINES (1 - Tdap) 12/10/2020 (Originally 9/13/1946)   • MEDICARE ANNUAL WELLNESS  12/10/2020   • INFLUENZA VACCINE  Completed   • PNEUMOCOCCAL VACCINES (65+ LOW/MEDIUM RISK)  Completed       Depression Screen:   PHQ-2/PHQ-9 Depression Screening 12/10/2019   Little interest or pleasure in doing things 0   Feeling down, depressed, or hopeless 0   Total Score 0       Functional and Cognitive Screening:  Functional & Cognitive Status 12/10/2019   Do you have difficulty preparing food and eating? No   Do you have difficulty bathing yourself, getting dressed or grooming yourself? No   Do you have difficulty using the toilet? No   Do you have difficulty moving around from place to place? No   Do you have trouble with steps or getting out of a bed or a chair? No   Current Diet Well Balanced Diet   Dental Exam Up to date   Eye Exam Up to date   Exercise (times per week) 2 times per week   Current Exercise Activities Include Dancing   Do you need help using the phone?  No   Are you deaf or do you have serious difficulty hearing?  No   Do you need help with transportation? No   Do you need help shopping? No   Do you need help preparing meals?  No   Do you need help with housework?  Yes   Do you need help with laundry? No   Do you need help taking your medications? No   Do you need help  "managing money? No   Do you ever drive or ride in a car without wearing a seat belt? No   Have you felt unusual stress, anger or loneliness in the last month? No   Who do you live with? Alone   If you need help, do you have trouble finding someone available to you? No   Have you been bothered in the last four weeks by sexual problems? No   Do you have difficulty concentrating, remembering or making decisions? No     Does the patient have evidence of cognitive impairment? none    Compared to one year ago, the patient feels their physical health and mental health are the same.       Review of Systems   Constitutional: Negative.    HENT: Negative.    Eyes: Negative.    Respiratory: Negative.    Cardiovascular: Negative.    Gastrointestinal: Negative.    Endocrine: Negative.    Genitourinary: Negative.    Musculoskeletal: Positive for back pain.   Skin: Negative.    Allergic/Immunologic: Negative.    Neurological: Negative.    Hematological: Negative.    Psychiatric/Behavioral: Negative.        Objective     Vitals:    12/10/19 1432   BP: 94/60   Pulse: 68   Resp: 16   SpO2: 98%   Weight: 78 kg (172 lb)   Height: 157.5 cm (62\")   PainSc:   4   PainLoc: Back       Body mass index is 31.46 kg/m².    PHYSICAL EXAM  Vitals reviewed and on chart.  HEENT: PERRLA, EOMI. Oral mucosa moist,   No LAD.  CV: RRR, no murmurs, rubs, clicks or gallops  LUNGS: CTA bilaterally  EXT: No edema, FROM in bilateral upper and lower ext  NEURO: CN II - XII grossly intact  PSYCH: good mood, positive affect, alert and engaged. No thoughts of self harm expressed.    ASSESSMENT AND PLAN      Problem List Items Addressed This Visit        Cardiovascular and Mediastinum    Hypertension  Well controlled on current medication, will refill medication today and as needed. He will RTO for repeat B/P check in 6 months.    Relevant Orders    Comprehensive Metabolic Panel (Completed)       Nervous and Auditory    Parkinson disease (CMS/HCC)  This is a " chronic problem that has been well managed on current medications. He is followed by neurology.      Relevant Orders    MRI Lumbar Spine Without Contrast      Other Visit Diagnoses     Medicare annual wellness visit, subsequent    -  Primary    Relevant Orders    CBC (No Diff) (Completed)    Lipid Panel With / Chol / HDL Ratio (Completed)    Chronic right-sided low back pain with right-sided sciatica      I have ordered imaging to see if we can find a source of this pain.     Relevant Orders    MRI Lumbar Spine Without Contrast          Orders:  Orders Placed This Encounter   Procedures   • MRI Lumbar Spine Without Contrast   • CBC (No Diff)   • Comprehensive Metabolic Panel   • Lipid Panel With / Chol / HDL Ratio            Medicare Wellness  Personal Prevention Counseling and Plan of Service   I have referred you for an MRI of your back and I will call you with results.   Date of Office Visit:  12/10/2019  Encounter Provider:  Eduardo Snowden MD  Place of Service:  [facility]  Patient Name: Donnell Nguyen  :  1935    As part of the Medicare Wellness portion of your visit today, we are providing you with this personalized preventive plan of services (PPPS). This plan is based upon recommendations of the [place] Preventive Services Task Force (USPSTF) and the Advisory Committee on Immunization Practices (ACIP).    This lists the preventive care services that should be considered, and provides dates of when you are due. Items listed as completed are up-to-date and do not require any further intervention.    Health Maintenance   Topic Date Due   • ZOSTER VACCINE (2 of 3) 12/10/2019 (Originally 2015)   • TDAP/TD VACCINES (1 - Tdap) 12/10/2020 (Originally 1946)   • MEDICARE ANNUAL WELLNESS  12/10/2020   • INFLUENZA VACCINE  Completed   • PNEUMOCOCCAL VACCINES (65+ LOW/MEDIUM RISK)  Completed       Orders Placed This Encounter   Procedures   • MRI Lumbar Spine  Without Contrast       Return in about 6 months (around 6/10/2020) for Recheck.          Follow Up:  Return in about 6 months (around 6/10/2020) for Recheck.     ADVANCED DIRECTIVES:   Patient has an advance directive - a copy has not been provided. Have asked the patient to send this to us to add to record    An After Visit Summary and PPPS with all of these plans were given to the patient.

## 2019-12-10 NOTE — PATIENT INSTRUCTIONS
Medicare Wellness  Personal Prevention Counseling and Plan of Service   I have referred you for an MRI of your back and I will call you with results.   Date of Office Visit:  12/10/2019  Encounter Provider:  Eduardo Snowden MD  Place of Service:  [facility]  Patient Name: Donnell Nguyen  :  1935    As part of the Medicare Wellness portion of your visit today, we are providing you with this personalized preventive plan of services (PPPS). This plan is based upon recommendations of the [place] Preventive Services Task Force (USPSTF) and the Advisory Committee on Immunization Practices (ACIP).    This lists the preventive care services that should be considered, and provides dates of when you are due. Items listed as completed are up-to-date and do not require any further intervention.    Health Maintenance   Topic Date Due   • ZOSTER VACCINE (2 of 3) 12/10/2019 (Originally 2015)   • TDAP/TD VACCINES (1 - Tdap) 12/10/2020 (Originally 1946)   • MEDICARE ANNUAL WELLNESS  12/10/2020   • INFLUENZA VACCINE  Completed   • PNEUMOCOCCAL VACCINES (65+ LOW/MEDIUM RISK)  Completed       Orders Placed This Encounter   Procedures   • MRI Lumbar Spine Without Contrast       Return in about 6 months (around 6/10/2020) for Recheck.

## 2019-12-11 LAB
ALBUMIN SERPL-MCNC: 4.5 G/DL (ref 3.5–4.7)
ALBUMIN/GLOB SERPL: 2 {RATIO} (ref 1.2–2.2)
ALP SERPL-CCNC: 73 IU/L (ref 39–117)
ALT SERPL-CCNC: 10 IU/L (ref 0–44)
AST SERPL-CCNC: 16 IU/L (ref 0–40)
BILIRUB SERPL-MCNC: 0.3 MG/DL (ref 0–1.2)
BUN SERPL-MCNC: 20 MG/DL (ref 8–27)
BUN/CREAT SERPL: 17 (ref 10–24)
CALCIUM SERPL-MCNC: 9.3 MG/DL (ref 8.6–10.2)
CHLORIDE SERPL-SCNC: 106 MMOL/L (ref 96–106)
CHOLEST SERPL-MCNC: 197 MG/DL (ref 100–199)
CHOLEST/HDLC SERPL: 4.4 RATIO (ref 0–5)
CO2 SERPL-SCNC: 20 MMOL/L (ref 20–29)
CREAT SERPL-MCNC: 1.15 MG/DL (ref 0.76–1.27)
ERYTHROCYTE [DISTWIDTH] IN BLOOD BY AUTOMATED COUNT: 13.5 % (ref 12.3–15.4)
GLOBULIN SER CALC-MCNC: 2.3 G/DL (ref 1.5–4.5)
GLUCOSE SERPL-MCNC: 82 MG/DL (ref 65–99)
HCT VFR BLD AUTO: 42.8 % (ref 37.5–51)
HDLC SERPL-MCNC: 45 MG/DL
HGB BLD-MCNC: 14.3 G/DL (ref 13–17.7)
LDLC SERPL CALC-MCNC: 119 MG/DL (ref 0–99)
MCH RBC QN AUTO: 30.6 PG (ref 26.6–33)
MCHC RBC AUTO-ENTMCNC: 33.4 G/DL (ref 31.5–35.7)
MCV RBC AUTO: 92 FL (ref 79–97)
PLATELET # BLD AUTO: 212 X10E3/UL (ref 150–450)
POTASSIUM SERPL-SCNC: 4.2 MMOL/L (ref 3.5–5.2)
PROT SERPL-MCNC: 6.8 G/DL (ref 6–8.5)
RBC # BLD AUTO: 4.67 X10E6/UL (ref 4.14–5.8)
SODIUM SERPL-SCNC: 145 MMOL/L (ref 134–144)
TRIGL SERPL-MCNC: 165 MG/DL (ref 0–149)
VLDLC SERPL CALC-MCNC: 33 MG/DL (ref 5–40)
WBC # BLD AUTO: 5.5 X10E3/UL (ref 3.4–10.8)

## 2019-12-20 ENCOUNTER — HOSPITAL ENCOUNTER (OUTPATIENT)
Dept: MRI IMAGING | Facility: HOSPITAL | Age: 84
Discharge: HOME | End: 2019-12-20
Admitting: FAMILY MEDICINE

## 2019-12-20 LAB — CREAT BLDA-MCNC: 1.1 MG/DL (ref 0.6–1.3)

## 2019-12-20 PROCEDURE — 82565 ASSAY OF CREATININE: CPT

## 2019-12-20 PROCEDURE — 72158 MRI LUMBAR SPINE W/O & W/DYE: CPT

## 2019-12-20 PROCEDURE — 0 GADOBENATE DIMEGLUMINE 529 MG/ML SOLUTION: Performed by: FAMILY MEDICINE

## 2019-12-20 PROCEDURE — A9577 INJ MULTIHANCE: HCPCS | Performed by: FAMILY MEDICINE

## 2019-12-20 RX ADMIN — GADOBENATE DIMEGLUMINE 16 ML: 529 INJECTION, SOLUTION INTRAVENOUS at 13:59

## 2019-12-30 DIAGNOSIS — M89.9 BONE LESION: Primary | ICD-10-CM

## 2020-01-03 ENCOUNTER — TELEPHONE (OUTPATIENT)
Dept: FAMILY MEDICINE CLINIC | Facility: CLINIC | Age: 85
End: 2020-01-03

## 2020-01-07 DIAGNOSIS — M89.9 BONE LESION: Primary | ICD-10-CM

## 2020-01-08 ENCOUNTER — HOSPITAL ENCOUNTER (OUTPATIENT)
Dept: NUCLEAR MEDICINE | Facility: HOSPITAL | Age: 85
Discharge: HOME | End: 2020-01-08

## 2020-01-08 ENCOUNTER — APPOINTMENT (OUTPATIENT)
Dept: NUCLEAR MEDICINE | Facility: HOSPITAL | Age: 85
End: 2020-01-08

## 2020-01-08 ENCOUNTER — HOSPITAL ENCOUNTER (OUTPATIENT)
Dept: NUCLEAR MEDICINE | Facility: HOSPITAL | Age: 85
End: 2020-01-08

## 2020-01-08 DIAGNOSIS — M89.9 BONE LESION: ICD-10-CM

## 2020-01-08 PROCEDURE — A9503 TC99M MEDRONATE: HCPCS | Performed by: FAMILY MEDICINE

## 2020-01-08 PROCEDURE — 0 TECHNETIUM MEDRONATE KIT: Performed by: FAMILY MEDICINE

## 2020-01-08 PROCEDURE — 78306 BONE IMAGING WHOLE BODY: CPT

## 2020-01-08 RX ORDER — TC 99M MEDRONATE 20 MG/10ML
21.5 INJECTION, POWDER, LYOPHILIZED, FOR SOLUTION INTRAVENOUS
Status: COMPLETED | OUTPATIENT
Start: 2020-01-08 | End: 2020-01-08

## 2020-01-08 RX ADMIN — Medication 21.5 MILLICURIE: at 10:28

## 2020-06-10 ENCOUNTER — TELEMEDICINE (OUTPATIENT)
Dept: FAMILY MEDICINE CLINIC | Facility: CLINIC | Age: 85
End: 2020-06-10

## 2020-06-10 VITALS — SYSTOLIC BLOOD PRESSURE: 122 MMHG | DIASTOLIC BLOOD PRESSURE: 54 MMHG

## 2020-06-10 DIAGNOSIS — I10 ESSENTIAL HYPERTENSION: Primary | ICD-10-CM

## 2020-06-10 PROCEDURE — 99213 OFFICE O/P EST LOW 20 MIN: CPT | Performed by: FAMILY MEDICINE

## 2020-06-10 RX ORDER — LEVODOPA AND CARBIDOPA 95; 23.75 MG/1; MG/1
3 CAPSULE, EXTENDED RELEASE ORAL 4 TIMES DAILY
COMMUNITY
Start: 2020-06-01

## 2020-06-10 NOTE — PROGRESS NOTES
Subjective   Donnell Nguyen is a 84 y.o. male.   Hypertension  You have chosen to receive care through a telehealth visit.  Do you consent to use a video/audio connection for your medical care today? Yes  History of Present Illness   Mr Nguyen is having a video visit for his 6 mo follow-up on his blood pressure.  He states he takes his blood pressure from time to time and it has been within normal limits.  He is taking his medications and reports no side effects.  He states he has no concerns about his health.  He is a fairly new , lost his wife less than a year ago, and has been living in his own home by himself.  He has Parkinson's disease and seems to be stable.  He is getting some help from his house  once a month and he cooks for himself.    The following portions of the patient's history were reviewed and updated as appropriate: allergies, current medications, past family history, past medical history, past social history, past surgical history and problem list.    Review of Systems   Constitutional: Negative for fever.   Eyes: Negative for visual disturbance.   Respiratory: Negative for cough and shortness of breath.    Neurological: Negative for dizziness and headache.   All other systems reviewed and are negative.      Objective   Physical Exam   Constitutional: He appears well-developed and well-nourished.   HENT:   Head: Normocephalic and atraumatic.   Eyes: Pupils are equal, round, and reactive to light. EOM are normal.   Pulmonary/Chest: Effort normal.   Neurological: He is alert.   Psychiatric: He has a normal mood and affect.   Vitals reviewed.        Assessment/Plan   Problem List Items Addressed This Visit        Cardiovascular and Mediastinum    Hypertension - Primary  Well controlled on current medication, will refill medication today and as needed. He will RTO for repeat B/P check in 6 months.               Return in about 6 months (around 12/10/2020) for Annual physical.

## 2020-11-11 ENCOUNTER — TELEPHONE (OUTPATIENT)
Dept: FAMILY MEDICINE CLINIC | Facility: CLINIC | Age: 85
End: 2020-11-11

## 2020-11-11 NOTE — TELEPHONE ENCOUNTER
PT CALLED STATING HE HAS BEEN GETTING NOTICES FROM [employer] THAT HIS IMMUNIZATIONS ARE NOT UP TO DATE. HE IS REQUESTING THAT WE CHECK TO SEE IF THERE IS ANYTHING HE IS DUE OR OVERDUE FOR, AND CALL HIM BACK TO LET HIM KNOW.    CALLBACK NUMBER: [phone]

## 2021-01-11 RX ORDER — METOPROLOL SUCCINATE 50 MG/1
50 TABLET, EXTENDED RELEASE ORAL DAILY
Qty: 90 TABLET | Refills: 3 | Status: SHIPPED | OUTPATIENT
Start: 2021-01-11 | End: 2021-04-13 | Stop reason: SDUPTHER

## 2021-01-11 NOTE — TELEPHONE ENCOUNTER
Caller: Genaro Nguyenchristen    Relationship: Self    Best call back number: [phone]    Medication needed:   Requested Prescriptions     Pending Prescriptions Disp Refills   • metoprolol succinate XL (TOPROL-XL) 50 MG 24 hr tablet       Sig: Take 1 tablet by mouth Daily.       When do you need the refill by: 1/15    What details did the patient provide when requesting the medication: need by friday    Does the patient have less than a 3 day supply:  [] Yes  [x] No    What is the patient's preferred pharmacy:      [facility] - [place], [place] - [address] [place] [address] - [phone]  - [phone] [address] [address]   Phone: [phone] Fax: [phone]

## 2021-03-02 ENCOUNTER — TELEPHONE (OUTPATIENT)
Dept: FAMILY MEDICINE CLINIC | Facility: CLINIC | Age: 86
End: 2021-03-02

## 2021-03-02 NOTE — TELEPHONE ENCOUNTER
PATIENT CALLED STATING THAT HE IS NEEDING A WALKER TO NAVIGATE HIS HOUSE BETTER DUE TO PARKINSON.     Donnell Nguyen (Self) [phone] (M)     PATIENT ALSO STATING THAT HE WAS NEEDING PHYSICAL THERAPY AS WELL.

## 2021-03-03 DIAGNOSIS — Z23 IMMUNIZATION DUE: ICD-10-CM

## 2021-03-04 ENCOUNTER — TELEMEDICINE (OUTPATIENT)
Dept: FAMILY MEDICINE CLINIC | Facility: CLINIC | Age: 86
End: 2021-03-04

## 2021-03-04 DIAGNOSIS — M54.41 CHRONIC RIGHT-SIDED LOW BACK PAIN WITH RIGHT-SIDED SCIATICA: ICD-10-CM

## 2021-03-04 DIAGNOSIS — G89.29 CHRONIC RIGHT-SIDED LOW BACK PAIN WITH RIGHT-SIDED SCIATICA: ICD-10-CM

## 2021-03-04 DIAGNOSIS — Z74.09 DECLINING MOBILITY: ICD-10-CM

## 2021-03-04 DIAGNOSIS — G20 PARKINSON DISEASE (HCC): Primary | ICD-10-CM

## 2021-03-04 PROCEDURE — 99214 OFFICE O/P EST MOD 30 MIN: CPT | Performed by: FAMILY MEDICINE

## 2021-03-04 NOTE — PROGRESS NOTES
Mina Nguyen is a 85 y.o. male.   Immobility    History of Present Illness   Mr Nguyen is having a video visit to discuss his mobility issues.  He has Parkinsons and it is becoming mor difficult for him to ambulate and care for himself in his home.  He is a  and lives by himself.  He has 2 daughters but they both live out of town.  He is unsteady on his feet and has balance problems.  He now needs help getting in and out of bed and help with activities of daily living such as showering and managing food and cleaning the house.  Right now, he has hired home assistance for 4 hours a day.  This is beginning to get very expensive and is not sure how long he can maintain this expense.  He is talking with his daughter who lives in [facility] but eventually joining her in her home but he is a little concerned because he has so much in his house to get rid of any have to figure out how to sell his home.  He wants to stay where he is for as long as he can.  He is asking me if Medicare will help pay for any of this assistance.  I am not certain that it will but I told him I would look into finding out what could be done for him on a Medicare budget.  Physical therapy from the Parkinson program at [facility] is coming out today and he thinks they are going to help him with the assistance devices he needs to ambulate safely around his home.  But if they cannot, I have encouraged him to call me.  The following portions of the patient's history were reviewed and updated as appropriate: allergies, current medications, past family history, past medical history, past social history, past surgical history and problem list.    Review of Systems   Constitutional: Negative.    HENT: Negative.    Eyes: Negative.    Respiratory: Negative.    Cardiovascular: Negative.    Genitourinary: Negative.    Musculoskeletal: Positive for arthralgias and gait problem.        Left shoulder pain   Skin: Negative.    Neurological:  Positive for tremors. Negative for dizziness, memory problem and confusion.        Muscle stiffness from Parkinsons   Psychiatric/Behavioral: Negative.        Objective   Physical Exam   Constitutional: He appears well-developed and well-nourished.   HENT:   Head: Normocephalic and atraumatic.   Eyes: Pupils are equal, round, and reactive to light. Conjunctivae and EOM are normal.   Neck: Neck normal appearance.  Pulmonary/Chest: Effort normal.  No respiratory distress.  Musculoskeletal:      Comments: Difficulty with ambulation due to muscle stiffness   Neurological: He is alert.   Skin: No rash noted.   Psychiatric: He has a normal mood and affect.   Vitals reviewed.        Assessment/Plan   Problem List Items Addressed This Visit        Neuro    Parkinson disease (CMS/[facility]) - Primary    Relevant Orders    Ambulatory Referral to Case Management [facility]      Other Visit Diagnoses     Chronic right-sided low back pain with right-sided sciatica        Relevant Orders    Ambulatory Referral to Case Management [facility] Care [facility]    Declining mobility        Relevant Orders    Ambulatory Referral to Case Management [facility] - [facility] Care [facility]      He is alert and completely cognitively intact.  He is struggling about what to do next.  He has the Parkinson's program from [employer] coming in to help him with physical therapy.  He needs help with ADLs and is concerned that he will not be able to afford to pay for it much longer so he is asked my assistance in evaluating her looking to see if there are any programs out there that might help him in except at least partial payment through Medicare.  I have referred him to ambulatory case management to see if these kind folks with lots of resources can help us sort out some help for him.         No follow-ups on file.

## 2021-03-05 ENCOUNTER — PATIENT OUTREACH (OUTPATIENT)
Dept: CASE MANAGEMENT | Facility: OTHER | Age: 86
End: 2021-03-05

## 2021-03-05 ENCOUNTER — EPISODE CHANGES (OUTPATIENT)
Dept: CASE MANAGEMENT | Facility: OTHER | Age: 86
End: 2021-03-05

## 2021-03-05 NOTE — OUTREACH NOTE
Care Coordination Note    Spoke with [facility] and transferred to Ekta- VM left with request for a return call. [facility], and reports initial start of Care was yesterday with second visit planned 3/ 8 at 1 PM.  Discussed additional disciplines with MSW and OT that would be beneficial for patient's in home care needs and she will initiate MSW services, evaluate for OT needs on 3/8 along with inform patient of planned MSW follow-up with their agency to discuss further additional community resources and patient's finacial concerns.   Spoke with Mahi Gonzalez CM ([phone]) and [employer] ID provided including name and  and informed of patient request for ACM support with referral for CM assistance..  Routed to Dr. Snowden.    Valarie Simmons, RN  Ambulatory     3/5/2021, 13:42 EST

## 2021-03-05 NOTE — OUTREACH NOTE
Care Plan Note      Responses   Lifestyle Goals  Attend a support group, Decrease falls risk, Eat a healthy diet, Medication management, Maintain blood pressure < 130/80, Routine follow-up with doctor(s) [Parkinsons Support group]   Barriers  Financial   Self Management  Medication Adherence   Suggested Appointments  -- [[employer] CM program]   AWV Materials  Send Materials   Care Gaps Addressed  Flu Shot   Flu Shot Status  Up to Date   Flu Shot Completion at [facility] or Other  Other   Other Location  [facility]   Care Gap Comments  COVID 19 [facility] [address]   Specific Disease Process Teaching  -- [Parkinsons]   Other Patient Education/Resources   24/7 [facility] Nurse Call Line, Advanced Care Planning, Home Healthcare, MyChart, Nutrition/Diet, Personal Caregiver, Social, Transportation   24/7 Nurse Call Line Education Method  Verbal   ACP Education Method  Verbal [To provide copy of Living Will and POA documents to PCP for EMR filing]   Home Healthcare Education Method  Verbal   MyChart Education Method  Verbal   Nutrition/Diet Education Method  Verbal   Personal Caregiver Education Method  Verbal   Social Resources Education Method  Verbal   Transportation Education Method  Verbal   Does patient have depression diagnosis?  No   Advanced Directives:  Patient Has [Prepared by Elder Law -Living Will and POA]   Ed Visits past 12 months:  None   Hospitalizations past 12 months  None   Medication Adherence  Medications understood   Goal Progress  Making Progress Toward Goal(s)   Readiness Scale  9   Confidence Scale  7   How often do you have someone help you read hospital materials?  Sometimes   How often do you have problems learning about your medical condition because of difficulty understanding written information?  Occasionally   How often do you have a problem understanding what is told to you about your medical condition?  Occasionally   How confident are  you filling out medical forms by yourself?  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  Consideration to moving with daughter in [place], [place]. Left sided weakness following second COVID 19 vaccine administration on 2021. Decline in Mobility and manuevering large walker throughout his home's narrow halls.  Recent difficulties with transfers from bending to standing position.     Education/instruction provided by Care Coordinator:  Reports followed by May Flores from [facility] and states she has coordinated home physical therapy services with [facility] to begin next week.  ([phone]) Has been informed that service will be covered at 100% with his Medicare. .  Also, to provide a walker that will accommodate his home setting in order to provide for safer device with ambulation and fall preventive measures. Has a fall emergency alert system.   Denies recent falls but had an issue bending down in his kitchen and difficulties rising to a standing position.  Has a bath chair and cane.  Monday through Friday private caregivers via [employer] for four hours each day.  Provide personal care assistance, transportation and shopping assistance along with meal preparations.  His dtr has arrange for meal delivery per [employer].  Medications are delivered per [facility] and adherent to regimen utilizing a medication planner. Discussed Medicare benefits for in home care with  limited intermittent skilled services, homebound status, physician order necessity and agreeable to outreach by AC to [facility].  He is not a  and worked for [employer] for 42 years.  His wife  in 2019 after 57 years of marriage.  Has investigated benefits through Parkinson's support group.  Discussed a Place  "for Mom, [facility], and [facility] Case Management services.  States does not want \"a lot of people in and out of his home,\" and prefers to begin with ACM assistance with [facility] and requested assistance by ACM to a  referral for  [facility] program. [employer] ACM would relay demographic information to [facility] and they would be in contact telephonically.   Provided his [facility] ID number His daughter in [place] is his POA, has built a new home with a private room and bath for him that he is considering the transition to her home. Requested he provide a copy of his Living Will and POA to PCP for EMR filing.     Follow Up Outreach Due: 3-5 days    Valarie Simmons RN  Ambulatory     3/5/2021, 13:06 EST    "

## 2021-03-18 ENCOUNTER — PATIENT OUTREACH (OUTPATIENT)
Dept: CASE MANAGEMENT | Facility: OTHER | Age: 86
End: 2021-03-18

## 2021-03-18 NOTE — OUTREACH NOTE
"Patient Outreach Note    [facility] physical therapist to visit tomorrow and reports, \"slow progress,\" with noticing any improvement with mobility.  The [facility]   evaluated for in home additional caregiving support with financial assistance.  States, \"make too much for any financial assistance.\" Plans to move in with his daughter, [place] residence, by mid May .   Arrangements in progress for selling of his home with assistance from estate company to auction the home and contents.  He is comfortable with this decision and coordinating efforts with his [employer] for burial of his wife's \"ashes,\" at [facility].  His daughter has secured a PCP for follow-up medical needs in [place] and encouraged to schedule an appt with Dr. Snowden prior to transfer of care and to ensure sufficient medications delivered to his home prior to his departure.  He plans to fly versus the extended car ride to his new residence. Denies further needs or concerns today. Routed call to PCP.    Valarie Simmons RN  Ambulatory     3/18/2021, 10:59 EDT      "

## 2021-04-06 ENCOUNTER — PATIENT OUTREACH (OUTPATIENT)
Dept: CASE MANAGEMENT | Facility: OTHER | Age: 86
End: 2021-04-06

## 2021-04-06 NOTE — OUTREACH NOTE
Care Coordination Note    In basket message to Dr. Snowden with update.     Valarie Simmons RN  Ambulatory     4/6/2021, 11:20 EDT

## 2021-04-06 NOTE — OUTREACH NOTE
"Patient Outreach Note    Patient has met care plan goals, all care gaps have been addressed, and patient has a strong sense of health self-management. The patient's annual wellness vist has been completed, Active with My Chart.  His daughter will be arriving to assist with transition to her home in [place] with a flight reserved for 4/24/2021.  He declined assistance with obtaining Medical records for his new PCP review in [place] and prefers to wait until he gets to [place] and will have that physician office request the records.  He states has provided current PCP with new address and denies further assistance at this time.  Reports one remaining visit with physical therapist from [facility] and \"progressing slowly.\" . Patient has received advanced care planning materials or has an active advanced care plan in place..    Valarie Simmons RN  Ambulatory     4/6/2021, 11:15 EDT      "

## 2021-04-13 RX ORDER — METOPROLOL SUCCINATE 50 MG/1
50 TABLET, EXTENDED RELEASE ORAL DAILY
Qty: 90 TABLET | Refills: 1 | Status: SHIPPED | OUTPATIENT
Start: 2021-04-13 | End: 2021-04-21 | Stop reason: SDUPTHER

## 2021-04-13 NOTE — TELEPHONE ENCOUNTER
Caller: PatrickDonnell    Relationship: Self    Best call back number: [phone]    Medication needed:   Requested Prescriptions     Pending Prescriptions Disp Refills   • metoprolol succinate XL (TOPROL-XL) 50 MG 24 hr tablet 90 tablet 3     Sig: Take 1 tablet by mouth Daily.       When do you need the refill by: 04/15/2021    What additional details did the patient provide when requesting the medication: HAS ONE WEEK LEFT.     NEEDS TO GO TO OLD ADDRESS:  [address] [place] [address]    Does the patient have less than a 3 day supply:  [] Yes  [x] No    What is the patient's preferred pharmacy: [facility] - [facility], [place] [address] [phone] [facility] [phone]

## 2021-04-21 RX ORDER — METOPROLOL SUCCINATE 50 MG/1
50 TABLET, EXTENDED RELEASE ORAL DAILY
Qty: 90 TABLET | Refills: 1 | Status: SHIPPED | OUTPATIENT
Start: 2021-04-21 | End: 2022-02-28

## 2021-04-21 NOTE — TELEPHONE ENCOUNTER
PATIENT NEEDS REFILL ON:metoprolol succinate XL (TOPROL-XL) 50 MG 24 hr tablet     PATIENT CAN BE REACHED ON: [phone]    PHARMACY PREFERRED:[facility]/pharmacy #[id number] - [place], [place] - [address] AT [facility] - [phone] [facility] [phone]   [phone]

## 2022-02-28 RX ORDER — METOPROLOL SUCCINATE 50 MG/1
TABLET, EXTENDED RELEASE ORAL
Qty: 90 TABLET | Refills: 0 | Status: SHIPPED | OUTPATIENT
Start: 2022-02-28 | End: 2022-05-27

## 2022-05-27 RX ORDER — METOPROLOL SUCCINATE 50 MG/1
TABLET, EXTENDED RELEASE ORAL
Qty: 30 TABLET | Refills: 0 | Status: SHIPPED | OUTPATIENT
Start: 2022-05-27